# Patient Record
Sex: FEMALE | Race: OTHER | HISPANIC OR LATINO | Employment: UNEMPLOYED | URBAN - METROPOLITAN AREA
[De-identification: names, ages, dates, MRNs, and addresses within clinical notes are randomized per-mention and may not be internally consistent; named-entity substitution may affect disease eponyms.]

---

## 2023-07-10 ENCOUNTER — TELEPHONE (OUTPATIENT)
Dept: OBSTETRICS AND GYNECOLOGY | Facility: CLINIC | Age: 23
End: 2023-07-10
Payer: MEDICAID

## 2023-07-18 DIAGNOSIS — Z34.91 ENCOUNTER FOR PREGNANCY RELATED EXAMINATION IN FIRST TRIMESTER: Primary | ICD-10-CM

## 2023-07-19 ENCOUNTER — PROCEDURE VISIT (OUTPATIENT)
Dept: OBSTETRICS AND GYNECOLOGY | Facility: CLINIC | Age: 23
End: 2023-07-19
Payer: MEDICAID

## 2023-07-19 DIAGNOSIS — Z34.91 ENCOUNTER FOR PREGNANCY RELATED EXAMINATION IN FIRST TRIMESTER: ICD-10-CM

## 2023-07-19 PROCEDURE — 76817 TRANSVAGINAL US OBSTETRIC: CPT | Mod: S$GLB,,, | Performed by: OBSTETRICS & GYNECOLOGY

## 2023-07-19 PROCEDURE — 76817 US OB/GYN PROCEDURE (VIEWPOINT): ICD-10-PCS | Mod: S$GLB,,, | Performed by: OBSTETRICS & GYNECOLOGY

## 2023-08-01 ENCOUNTER — TELEPHONE (OUTPATIENT)
Dept: OBSTETRICS AND GYNECOLOGY | Facility: CLINIC | Age: 23
End: 2023-08-01
Payer: MEDICAID

## 2023-08-01 NOTE — TELEPHONE ENCOUNTER
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 Pt stated that she understands.      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 Can she safely accept the position in her condition? Please call back at [phone] as soon as possible, as she could potentially be skipped over if she waits too long to respond.

## 2023-08-22 LAB
ABS NRBC COUNT: 0 X 10 3/UL (ref 0–0.01)
ABSOLUTE BASOPHIL: 0.03 X 10 3/UL (ref 0–0.22)
ABSOLUTE EOSINOPHIL: 0.16 X 10 3/UL (ref 0.04–0.54)
ABSOLUTE IMMATURE GRAN: 0.05 X 10 3/UL (ref 0–0.04)
ABSOLUTE LYMPHOCYTE: 2.58 X 10 3/UL (ref 0.86–4.75)
ABSOLUTE MONOCYTE: 0.73 X 10 3/UL (ref 0.22–1.08)
AMPHETAMINES (500): NEGATIVE NG/ML
ANTIBODY SCREEN: NEGATIVE
BARBITURATES (200): NEGATIVE NG/ML
BASOPHILS NFR BLD: 0.3 % (ref 0.2–1.2)
BENZODIAZEPINES: NEGATIVE NG/ML
BLOOD GROUPING: NORMAL
BLOOD TYPE (D): POSITIVE
COCAINE (150): NEGATIVE NG/ML
EOSINOPHIL NFR BLD: 1.5 % (ref 0.7–7)
HBV SURFACE AG SERPL QL IA: NONREACTIVE
HCT VFR BLD AUTO: 39.3 % (ref 37–47)
HCV IGG SERPL QL IA: NONREACTIVE
HGB BLD-MCNC: 12.6 G/DL (ref 12–16)
HIV 1+2 AB+HIV1 P24 AG SERPL QL IA: NONREACTIVE
IMMATURE GRANULOCYTES: 0.5 % (ref 0–0.5)
LYMPHOCYTES NFR BLD: 23.8 % (ref 19.3–53.1)
MARIJUANA QUANTITATION: >300 NG/ML (ref 0–50)
MARIJUANA, THC (50): ABNORMAL NG/ML
MCH RBC QN AUTO: 26.3 PG (ref 27–32)
MCHC RBC AUTO-ENTMCNC: 32.1 G/DL (ref 32–36)
MCV RBC AUTO: 81.9 FL (ref 82–100)
METHADONE: NEGATIVE NG/ML
MONOCYTES NFR BLD: 6.7 % (ref 4.7–12.5)
NEUTROPHILS # BLD AUTO: 7.3 X 10 3/UL (ref 2.15–7.56)
NEUTROPHILS NFR BLD: 67.2 % (ref 34–71.1)
NUCLEATED RED BLOOD CELLS: 0 /100 WBC (ref 0–0.2)
OPIATES: NEGATIVE NG/ML
OXYCODONE: NEGATIVE NG/ML
PH: 7.4 (ref 4.5–8)
PHENCYCLIDINE (25): NEGATIVE NG/ML
PLATELET # BLD AUTO: 315 X 10 3/UL (ref 135–400)
RBC # BLD AUTO: 4.8 X 10 6/UL (ref 4.2–5.4)
RDW-SD: 43.7 FL (ref 37–54)
RUBELLA IGG SCREEN: NORMAL
SICKLE CELL PREP: NEGATIVE
SYPHILIS TREPONEMAL ANTIBODY: NONREACTIVE
URINE CREATININE D/S: 184.3 MG/DL
URINE CULTURE, ROUTINE: NORMAL
WBC # BLD: 10.85 X 10 3/UL (ref 4.3–10.8)

## 2023-08-29 ENCOUNTER — ROUTINE PRENATAL (OUTPATIENT)
Dept: OBSTETRICS AND GYNECOLOGY | Facility: CLINIC | Age: 23
End: 2023-08-29
Payer: MEDICAID

## 2023-08-29 VITALS — DIASTOLIC BLOOD PRESSURE: 82 MMHG | HEART RATE: 76 BPM | SYSTOLIC BLOOD PRESSURE: 135 MMHG | WEIGHT: 277 LBS

## 2023-08-29 DIAGNOSIS — Z03.89 ENCOUNTER FOR OBSERVATION FOR OTHER SUSPECTED DISEASES AND CONDITIONS RULED OUT: ICD-10-CM

## 2023-08-29 DIAGNOSIS — Z34.91 ENCOUNTER FOR PREGNANCY RELATED EXAMINATION IN FIRST TRIMESTER: Primary | ICD-10-CM

## 2023-08-29 DIAGNOSIS — F12.10 MILD TETRAHYDROCANNABINOL (THC) ABUSE: ICD-10-CM

## 2023-08-29 DIAGNOSIS — E66.9 OBESITY, UNSPECIFIED CLASSIFICATION, UNSPECIFIED OBESITY TYPE, UNSPECIFIED WHETHER SERIOUS COMORBIDITY PRESENT: ICD-10-CM

## 2023-08-29 DIAGNOSIS — O16.9 ELEVATED BLOOD PRESSURE AFFECTING PREGNANCY, ANTEPARTUM: ICD-10-CM

## 2023-08-29 LAB
AMPHETAMINES (500): NEGATIVE NG/ML
BARBITURATES (200): NEGATIVE NG/ML
BENZODIAZEPINES: NEGATIVE NG/ML
COCAINE (150): NEGATIVE NG/ML
MARIJUANA QUANTITATION: >300 NG/ML (ref 0–50)
MARIJUANA, THC (50): ABNORMAL NG/ML
METHADONE: NEGATIVE NG/ML
OPIATES: NEGATIVE NG/ML
OXYCODONE: NEGATIVE NG/ML
PH: 5.8 (ref 4.5–8)
PHENCYCLIDINE (25): NEGATIVE NG/ML
URINE CREATININE D/S: 348.6 MG/DL

## 2023-08-29 PROCEDURE — 99204 PR OFFICE/OUTPT VISIT, NEW, LEVL IV, 45-59 MIN: ICD-10-PCS | Mod: TH,S$GLB,, | Performed by: OBSTETRICS & GYNECOLOGY

## 2023-08-29 PROCEDURE — 99204 OFFICE O/P NEW MOD 45 MIN: CPT | Mod: TH,S$GLB,, | Performed by: OBSTETRICS & GYNECOLOGY

## 2023-08-29 NOTE — PROGRESS NOTES
Subjective:       Patient ID: Naina Brown is a 22 y.o.  at 12w4d   Chief Complaint:  Initial Prenatal Visit      History of Present Illness  here for new ob exam.  Labs and history were reviewed with the patient today  No complaints      History reviewed. No pertinent past medical history.    History reviewed. No pertinent surgical history.    OB:    OB History    Para Term  AB Living   1             SAB IAB Ectopic Multiple Live Births                  # Outcome Date GA Lbr Byron/2nd Weight Sex Delivery Anes PTL Lv   1 Current              Gyn: no STD, never had abn pap   Meds: No current outpatient medications on file.    All: Review of patient's allergies indicates:  No Known Allergies    SH:   Social History     Tobacco Use    Smoking status: Never    Smokeless tobacco: Never   Substance Use Topics    Alcohol use: Not Currently      FH: family history is not on file.      Review of Systems  nml 1st trimester sx- sob, dec excercixe tolerance, fatigue and nausea  Neg for vag bleed, dc, vomiting, cp, lof, fever, chills, ns, visual changes, swelling, headaches, constipation/diarrhea, dysuria, freq/urgency of urination     Objective:     Vitals:    23 1259   BP: 135/82   Pulse: 76   Weight: 125.6 kg (277 lb)       NAD  NCAT  pupils normal size  Skin nml no rashes or lesions  No resp distress, resp even and unlabored  nt nd, no rebound no guarding  nml ext fem gent, normal cvx uterus and adnexa, no dc or bleeding  nml appearing rectum  No cyanosis or clubbing, edema appropriate for pregn    Uterus size approp for gest age         Assessment:        1. Encounter for pregnancy related examination in first trimester    2. Encounter for observation for other suspected diseases and conditions ruled out    3. Mild tetrahydrocannabinol (THC) abuse    4. Elevated blood pressure affecting pregnancy, antepartum    5. Obesity, unspecified classification, unspecified obesity type, unspecified  whether serious comorbidity present               Plan:      Encounter for pregnancy related examination in first trimester  -     Liquid-based pap smear, screening  -     Drug Screen 8 Panel    Encounter for observation for other suspected diseases and conditions ruled out  -     Drug Screen 8 Panel    Mild tetrahydrocannabinol (THC) abuse    Elevated blood pressure affecting pregnancy, antepartum    Obesity, unspecified classification, unspecified obesity type, unspecified whether serious comorbidity present           Pain fever bleeding precautions  Encouraged PNV  rtc 4 wks    Baby asa  Limit weight gain  Talked about stopping THC use

## 2023-09-01 ENCOUNTER — NURSE TRIAGE (OUTPATIENT)
Dept: ADMINISTRATIVE | Facility: CLINIC | Age: 23
End: 2023-09-01
Payer: MEDICAID

## 2023-09-01 LAB
CHLAMYDIA: NEGATIVE
GONORRHEA: NEGATIVE
Lab: NORMAL
SOURCE: NORMAL
SOURCE: NORMAL
TRICHOMONAS AMPLIFIED: NEGATIVE

## 2023-09-01 NOTE — TELEPHONE ENCOUNTER
Pt reports lower back pain 7/10 w/ intermittent abdominal pain 3/10. Spotting yesterday. Advised, per protocol. Verbalizes understanding.    Reason for Disposition   MODERATE-SEVERE abdominal pain    Additional Information   Negative: Passed out (i.e., fainted, collapsed and was not responding)   Negative: Shock suspected (e.g., cold/pale/clammy skin, too weak to stand, low BP, rapid pulse)   Negative: Difficult to awaken or acting confused (e.g., disoriented, slurred speech)   Negative: Sounds like a life-threatening emergency to the triager    Protocols used: Pregnancy - Abdominal Pain Less Than 20 Weeks EGA-A-OH

## 2023-09-22 ENCOUNTER — PATIENT MESSAGE (OUTPATIENT)
Dept: OTHER | Facility: OTHER | Age: 23
End: 2023-09-22
Payer: MEDICAID

## 2023-09-25 ENCOUNTER — ROUTINE PRENATAL (OUTPATIENT)
Dept: OBSTETRICS AND GYNECOLOGY | Facility: CLINIC | Age: 23
End: 2023-09-25
Payer: MEDICAID

## 2023-09-25 VITALS — WEIGHT: 284 LBS | DIASTOLIC BLOOD PRESSURE: 84 MMHG | HEART RATE: 96 BPM | SYSTOLIC BLOOD PRESSURE: 131 MMHG

## 2023-09-25 DIAGNOSIS — Z34.02 ENCOUNTER FOR SUPERVISION OF NORMAL FIRST PREGNANCY IN SECOND TRIMESTER: Primary | ICD-10-CM

## 2023-09-25 PROCEDURE — 99213 OFFICE O/P EST LOW 20 MIN: CPT | Mod: TH,S$GLB,, | Performed by: OBSTETRICS & GYNECOLOGY

## 2023-09-25 PROCEDURE — 99213 PR OFFICE/OUTPT VISIT, EST, LEVL III, 20-29 MIN: ICD-10-PCS | Mod: TH,S$GLB,, | Performed by: OBSTETRICS & GYNECOLOGY

## 2023-09-25 NOTE — PROGRESS NOTES
Subjective:       Patient ID: Naina Brown is a 23 y.o.  at 16w3d      Chief Complaint:  Routine Prenatal Visit      History of Present Illness  No complaints. Reports normal sx. Labs and history reviewed with pt.       Review of Systems  Denies n/v, f/c, dysuria, contractions,   VD, VB, round ligament pain, headaches       Objective:     Vitals:    23 1309   BP: 131/84   Pulse: 96     Wt Readings from Last 3 Encounters:   23 128.8 kg (284 lb)   23 125.6 kg (277 lb)        nad  NCAT  pupils normal size  Skin nml no rashes or lesions  No resp distress, resp even and unlabored  Gravid nt, no rebound no guarding  No cyanosis or clubbing, edema appropriate for pregn    FHT: 150's       Assessment:        1. Encounter for supervision of normal first pregnancy in second trimester                Plan:      Encouraged PNV  Pain, fever, bleeding precautions   RTC 4 weeks

## 2023-09-29 ENCOUNTER — PATIENT MESSAGE (OUTPATIENT)
Dept: OTHER | Facility: OTHER | Age: 23
End: 2023-09-29
Payer: MEDICAID

## 2023-10-05 LAB
AFP MOM: 0.57
AFP, SERUM: 23 NG/ML
CALC'D GESTATIONAL AGE: 20.1 WEEKS
COLLECTION DATE: NORMAL
COMMENT: NORMAL
DATE OF BIRTH: NORMAL
DONOR AGE: EGG RETRIEVAL: NORMAL
DONOR EGG: NO
EDD DETERMINED BY: NORMAL
EST'D DATE OF DELIVERY: NORMAL
HX OF NEURAL TUBE DEFECTS: NO
INSULIN DEPEND DIABETIC: NO
INTERPRETATION: NORMAL
Lab: NORMAL
MATERNAL WEIGHT: 282 LBS
MOTHER'S ETHNIC ORIGIN: NORMAL
NUMBER OF FETUSES: 1
PREV PREGNANCY DOWN SYND: NO
REPEAT SPECIMEN: NO
RISK FOR ONTD: NORMAL

## 2023-10-20 ENCOUNTER — PATIENT MESSAGE (OUTPATIENT)
Dept: OTHER | Facility: OTHER | Age: 23
End: 2023-10-20
Payer: MEDICAID

## 2023-10-23 ENCOUNTER — ROUTINE PRENATAL (OUTPATIENT)
Dept: OBSTETRICS AND GYNECOLOGY | Facility: CLINIC | Age: 23
End: 2023-10-23
Payer: MEDICAID

## 2023-10-23 VITALS
HEIGHT: 63 IN | HEART RATE: 89 BPM | SYSTOLIC BLOOD PRESSURE: 121 MMHG | BODY MASS INDEX: 51.05 KG/M2 | WEIGHT: 288.13 LBS | DIASTOLIC BLOOD PRESSURE: 78 MMHG

## 2023-10-23 DIAGNOSIS — Z36.89 ENCOUNTER FOR FETAL ANATOMIC SURVEY: ICD-10-CM

## 2023-10-23 DIAGNOSIS — Z34.82 ENCOUNTER FOR SUPERVISION OF NORMAL PREGNANCY IN MULTIGRAVIDA IN SECOND TRIMESTER: Primary | ICD-10-CM

## 2023-10-23 PROCEDURE — 99213 OFFICE O/P EST LOW 20 MIN: CPT | Mod: TH,S$GLB,, | Performed by: NURSE PRACTITIONER

## 2023-10-23 PROCEDURE — 99213 PR OFFICE/OUTPT VISIT, EST, LEVL III, 20-29 MIN: ICD-10-PCS | Mod: TH,S$GLB,, | Performed by: NURSE PRACTITIONER

## 2023-10-23 RX ORDER — ASPIRIN 81 MG/1
81 TABLET ORAL DAILY
COMMUNITY

## 2023-10-23 NOTE — PROGRESS NOTES
Subjective:       Patient ID: Naina Brown is a 23 y.o.  at 20w3d      Chief Complaint:  Routine Prenatal Visit      History of Present Illness  No complaints. Reports normal fetal movement. Labs and history reviewed with pt.       Review of Systems  Denies n/v, f/c, dysuria, contractions,   VD, VB, round ligament pain, headaches      OB History          1    Para        Term                AB        Living             SAB        IAB        Ectopic        Multiple        Live Births                       Objective:     Vitals:    10/23/23 1033   BP: 121/78   Pulse: 89     Wt Readings from Last 3 Encounters:   10/23/23 130.7 kg (288 lb 2 oz)   23 128.8 kg (284 lb)   23 125.6 kg (277 lb)        nad  NCAT  pupils normal size  Skin nml no rashes or lesions  No resp distress, resp even and unlabored  Gravid nt, no rebound no guarding  No cyanosis or clubbing, edema appropriate for pregn  FH AGA  FHT: 140s       Assessment:        1. Encounter for supervision of normal pregnancy in multigravida in second trimester    2. Encounter for fetal anatomic survey                Plan:        Encounter for supervision of normal pregnancy in multigravida in second trimester    Encounter for fetal anatomic survey  -     US OB/GYN Procedure ([facility]) - Extended List; Future         Discussed flu vaccine  Encouraged PNV  Pain, fever, bleeding precautions   Follow up in about 4 weeks (around 2023).

## 2023-11-06 ENCOUNTER — PROCEDURE VISIT (OUTPATIENT)
Dept: OBSTETRICS AND GYNECOLOGY | Facility: CLINIC | Age: 23
End: 2023-11-06
Payer: MEDICAID

## 2023-11-06 DIAGNOSIS — Z36.89 ENCOUNTER FOR FETAL ANATOMIC SURVEY: ICD-10-CM

## 2023-11-06 PROCEDURE — 76805 US OB/GYN EXTENDED PROCEDURE (VIEWPOINT): ICD-10-PCS | Mod: S$GLB,,, | Performed by: OBSTETRICS & GYNECOLOGY

## 2023-11-06 PROCEDURE — 76805 OB US >/= 14 WKS SNGL FETUS: CPT | Mod: S$GLB,,, | Performed by: OBSTETRICS & GYNECOLOGY

## 2023-11-07 DIAGNOSIS — Z36.2 ENCOUNTER FOR FOLLOW-UP ULTRASOUND OF FETAL ANATOMY: Primary | ICD-10-CM

## 2023-11-20 ENCOUNTER — PROCEDURE VISIT (OUTPATIENT)
Dept: OBSTETRICS AND GYNECOLOGY | Facility: CLINIC | Age: 23
End: 2023-11-20
Payer: MEDICAID

## 2023-11-20 ENCOUNTER — ROUTINE PRENATAL (OUTPATIENT)
Dept: OBSTETRICS AND GYNECOLOGY | Facility: CLINIC | Age: 23
End: 2023-11-20
Payer: MEDICAID

## 2023-11-20 VITALS
WEIGHT: 293 LBS | BODY MASS INDEX: 52.83 KG/M2 | DIASTOLIC BLOOD PRESSURE: 77 MMHG | SYSTOLIC BLOOD PRESSURE: 113 MMHG | HEART RATE: 94 BPM

## 2023-11-20 DIAGNOSIS — Z34.02 ENCOUNTER FOR SUPERVISION OF NORMAL FIRST PREGNANCY IN SECOND TRIMESTER: Primary | ICD-10-CM

## 2023-11-20 DIAGNOSIS — Z36.2 ENCOUNTER FOR FOLLOW-UP ULTRASOUND OF FETAL ANATOMY: ICD-10-CM

## 2023-11-20 PROCEDURE — 99213 OFFICE O/P EST LOW 20 MIN: CPT | Mod: 25,TH,S$GLB, | Performed by: NURSE PRACTITIONER

## 2023-11-20 PROCEDURE — 76816 OB US FOLLOW-UP PER FETUS: CPT | Mod: S$GLB,,, | Performed by: OBSTETRICS & GYNECOLOGY

## 2023-11-20 PROCEDURE — 90686 IIV4 VACC NO PRSV 0.5 ML IM: CPT | Mod: S$GLB,,, | Performed by: NURSE PRACTITIONER

## 2023-11-20 PROCEDURE — 90471 FLU VACCINE (QUAD) GREATER THAN OR EQUAL TO 3YO PRESERVATIVE FREE IM: ICD-10-PCS | Mod: S$GLB,,, | Performed by: NURSE PRACTITIONER

## 2023-11-20 PROCEDURE — 99213 PR OFFICE/OUTPT VISIT, EST, LEVL III, 20-29 MIN: ICD-10-PCS | Mod: 25,TH,S$GLB, | Performed by: NURSE PRACTITIONER

## 2023-11-20 PROCEDURE — 90686 FLU VACCINE (QUAD) GREATER THAN OR EQUAL TO 3YO PRESERVATIVE FREE IM: ICD-10-PCS | Mod: S$GLB,,, | Performed by: NURSE PRACTITIONER

## 2023-11-20 PROCEDURE — 76816 US OB/GYN EXTENDED PROCEDURE (VIEWPOINT): ICD-10-PCS | Mod: S$GLB,,, | Performed by: OBSTETRICS & GYNECOLOGY

## 2023-11-20 PROCEDURE — 90471 IMMUNIZATION ADMIN: CPT | Mod: S$GLB,,, | Performed by: NURSE PRACTITIONER

## 2023-11-20 NOTE — PROGRESS NOTES
Subjective:       Patient ID: Naina Brown is a 23 y.o.  at 24w3d      Chief Complaint:  Routine Prenatal Visit      History of Present Illness  No complaints. Reports normal fetal movement. Labs and history reviewed with pt.       Review of Systems  Denies n/v, f/c, dysuria, contractions,   VD, VB, round ligament pain, headaches      OB History          1    Para        Term                AB        Living             SAB        IAB        Ectopic        Multiple        Live Births                       Objective:     Vitals:    23 1437   BP: 113/77   Pulse: 94     Wt Readings from Last 3 Encounters:   23 135.3 kg (298 lb 4 oz)   10/23/23 130.7 kg (288 lb 2 oz)   23 128.8 kg (284 lb)        nad  NCAT  pupils normal size  Skin nml no rashes or lesions  No resp distress, resp even and unlabored  Gravid nt, no rebound no guarding  No cyanosis or clubbing, edema appropriate for pregn  FH AGA  FHT: 150s       Assessment:        1. Encounter for supervision of normal first pregnancy in second trimester                Plan:        Encounter for supervision of normal first pregnancy in second trimester  -     Glucose, 1 Hr Post 50 GM; Future; Expected date: 2023  -     Influenza - Quadrivalent (PF)         KA for repeat u/s today   Encouraged PNV  Pain, fever, bleeding precautions   Follow up in about 4 weeks (around 2023).

## 2023-12-01 ENCOUNTER — PATIENT MESSAGE (OUTPATIENT)
Dept: OTHER | Facility: OTHER | Age: 23
End: 2023-12-01
Payer: MEDICAID

## 2023-12-15 ENCOUNTER — PATIENT MESSAGE (OUTPATIENT)
Dept: OTHER | Facility: OTHER | Age: 23
End: 2023-12-15
Payer: MEDICAID

## 2023-12-18 ENCOUNTER — ROUTINE PRENATAL (OUTPATIENT)
Dept: OBSTETRICS AND GYNECOLOGY | Facility: CLINIC | Age: 23
End: 2023-12-18
Payer: MEDICAID

## 2023-12-18 VITALS
HEART RATE: 98 BPM | DIASTOLIC BLOOD PRESSURE: 77 MMHG | SYSTOLIC BLOOD PRESSURE: 119 MMHG | BODY MASS INDEX: 52.97 KG/M2 | WEIGHT: 293 LBS

## 2023-12-18 DIAGNOSIS — Z34.03 ENCOUNTER FOR SUPERVISION OF NORMAL FIRST PREGNANCY IN THIRD TRIMESTER: Primary | ICD-10-CM

## 2023-12-18 LAB
ABS NRBC COUNT: 0 X 10 3/UL (ref 0–0.01)
ABSOLUTE BASOPHIL: 0.03 X 10 3/UL (ref 0–0.22)
ABSOLUTE EOSINOPHIL: 0.07 X 10 3/UL (ref 0.04–0.54)
ABSOLUTE IMMATURE GRAN: 0.13 X 10 3/UL (ref 0–0.04)
ABSOLUTE LYMPHOCYTE: 1.75 X 10 3/UL (ref 0.86–4.75)
ABSOLUTE MONOCYTE: 0.52 X 10 3/UL (ref 0.22–1.08)
AMPHETAMINES (500): NEGATIVE NG/ML
BARBITURATES (200): NEGATIVE NG/ML
BASOPHILS NFR BLD: 0.2 % (ref 0.2–1.2)
BENZODIAZEPINES: NEGATIVE NG/ML
COCAINE (150): NEGATIVE NG/ML
EOSINOPHIL NFR BLD: 0.6 % (ref 0.7–7)
GLUCOSE 1 HR POST 50 GM: 112 MG/DL
HCT VFR BLD AUTO: 33.9 % (ref 37–47)
HGB BLD-MCNC: 11 G/DL (ref 12–16)
IMMATURE GRANULOCYTES: 1 % (ref 0–0.5)
LYMPHOCYTES NFR BLD: 13.8 % (ref 19.3–53.1)
MARIJUANA, THC (50): NEGATIVE NG/ML
MCH RBC QN AUTO: 25.1 PG (ref 27–32)
MCHC RBC AUTO-ENTMCNC: 32.4 G/DL (ref 32–36)
MCV RBC AUTO: 77.4 FL (ref 82–100)
METHADONE: NEGATIVE NG/ML
MONOCYTES NFR BLD: 4.1 % (ref 4.7–12.5)
NEUTROPHILS # BLD AUTO: 10.14 X 10 3/UL (ref 2.15–7.56)
NEUTROPHILS NFR BLD: 80.3 % (ref 34–71.1)
NUCLEATED RED BLOOD CELLS: 0 /100 WBC (ref 0–0.2)
OPIATES: NEGATIVE NG/ML
OXYCODONE: NEGATIVE NG/ML
PH: 5.8 (ref 4.5–8)
PHENCYCLIDINE (25): NEGATIVE NG/ML
PLATELET # BLD AUTO: 311 X 10 3/UL (ref 135–400)
RBC # BLD AUTO: 4.38 X 10 6/UL (ref 4.2–5.4)
RDW-SD: 39.5 FL (ref 37–54)
URINE CREATININE D/S: 205.2 MG/DL
WBC # BLD: 12.64 X 10 3/UL (ref 4.3–10.8)

## 2023-12-18 PROCEDURE — 90715 TDAP VACCINE GREATER THAN OR EQUAL TO 7YO IM: ICD-10-PCS | Mod: S$GLB,,, | Performed by: OBSTETRICS & GYNECOLOGY

## 2023-12-18 PROCEDURE — 90471 TDAP VACCINE GREATER THAN OR EQUAL TO 7YO IM: ICD-10-PCS | Mod: S$GLB,,, | Performed by: OBSTETRICS & GYNECOLOGY

## 2023-12-18 PROCEDURE — 99213 OFFICE O/P EST LOW 20 MIN: CPT | Mod: 25,TH,S$GLB, | Performed by: OBSTETRICS & GYNECOLOGY

## 2023-12-18 PROCEDURE — 99213 PR OFFICE/OUTPT VISIT, EST, LEVL III, 20-29 MIN: ICD-10-PCS | Mod: 25,TH,S$GLB, | Performed by: OBSTETRICS & GYNECOLOGY

## 2023-12-18 PROCEDURE — 90715 TDAP VACCINE 7 YRS/> IM: CPT | Mod: S$GLB,,, | Performed by: OBSTETRICS & GYNECOLOGY

## 2023-12-18 PROCEDURE — 90471 IMMUNIZATION ADMIN: CPT | Mod: S$GLB,,, | Performed by: OBSTETRICS & GYNECOLOGY

## 2023-12-29 ENCOUNTER — PATIENT MESSAGE (OUTPATIENT)
Dept: OTHER | Facility: OTHER | Age: 23
End: 2023-12-29
Payer: MEDICAID

## 2024-01-12 ENCOUNTER — PATIENT MESSAGE (OUTPATIENT)
Dept: OTHER | Facility: OTHER | Age: 24
End: 2024-01-12
Payer: MEDICAID

## 2024-01-22 ENCOUNTER — ROUTINE PRENATAL (OUTPATIENT)
Dept: OBSTETRICS AND GYNECOLOGY | Facility: CLINIC | Age: 24
End: 2024-01-22
Payer: MEDICAID

## 2024-01-22 VITALS — WEIGHT: 293 LBS | BODY MASS INDEX: 58 KG/M2 | DIASTOLIC BLOOD PRESSURE: 86 MMHG | SYSTOLIC BLOOD PRESSURE: 137 MMHG

## 2024-01-22 DIAGNOSIS — Z34.03 ENCOUNTER FOR SUPERVISION OF NORMAL FIRST PREGNANCY IN THIRD TRIMESTER: Primary | ICD-10-CM

## 2024-01-22 DIAGNOSIS — O12.13 PROTEINURIA AFFECTING PREGNANCY IN THIRD TRIMESTER: ICD-10-CM

## 2024-01-22 DIAGNOSIS — E66.9 OBESITY, UNSPECIFIED CLASSIFICATION, UNSPECIFIED OBESITY TYPE, UNSPECIFIED WHETHER SERIOUS COMORBIDITY PRESENT: ICD-10-CM

## 2024-01-22 PROCEDURE — 99213 OFFICE O/P EST LOW 20 MIN: CPT | Mod: TH,S$GLB,, | Performed by: NURSE PRACTITIONER

## 2024-01-22 NOTE — PROGRESS NOTES
CC: Follow-Up OB    HPI:   23 y.o.  at 33w3d with EDC of 3/8/2024, by Ultrasound, here for her follow up OB visit.  Denies any complaints.    Pregnancy ROS:  Positive fetal movement   Negative leakage of fluid   Negative vaginal bleeding   Negative headache, vision changes, RUQ pain, epigastric pain  Negative contractions/abdominal pain   Negative pelvic pressure     Physical Exam:  Prenatal Vitals  BP: 137/86  Weight: (!) 148.5 kg (327 lb 6.4 oz)  Fetal Heart Rate: 140s  Urine Glucose: Negative  Urine Albumin: 1+    Wt Readings from Last 3 Encounters:   24 (!) 148.5 kg (327 lb 6.4 oz)   23 135.6 kg (299 lb)   23 135.3 kg (298 lb 4 oz)       Body mass index is 58 kg/m².    General: NAD, well developed, well nourished  Psych: alert and oriented to person, time and place, normal affect  HEENT: normocephalic, atraumatic  Abd: Gravid, soft, NT, ND  Skin: warm, dry  Neuro: normal gait, gross motor function intact  No cyanosis, clubbing or edema    FHTs: 140s      Maternal Blood Type: O+/-    ASSESSMENT: 23 y.o.  @ 33w3d with   1. Encounter for supervision of normal first pregnancy in third trimester    2. Obesity, unspecified classification, unspecified obesity type, unspecified whether serious comorbidity present    3. Proteinuria affecting pregnancy in third trimester         PLAN:  Encounter for supervision of normal first pregnancy in third trimester    Obesity, unspecified classification, unspecified obesity type, unspecified whether serious comorbidity present  -     US OB/GYN Procedure ([facility]); Future    Proteinuria affecting pregnancy in third trimester       BPP/growth u/s   Discussed POC with Dakotah Zimmerman   Pain, fever, bleeding precautions  Labor, Fetal movement, and Preeclampsia precautions  Cont PNV  Return to clinic in 1 weeks

## 2024-01-25 ENCOUNTER — PROCEDURE VISIT (OUTPATIENT)
Dept: OBSTETRICS AND GYNECOLOGY | Facility: CLINIC | Age: 24
End: 2024-01-25
Payer: MEDICAID

## 2024-01-25 DIAGNOSIS — E66.9 OBESITY, UNSPECIFIED CLASSIFICATION, UNSPECIFIED OBESITY TYPE, UNSPECIFIED WHETHER SERIOUS COMORBIDITY PRESENT: ICD-10-CM

## 2024-01-25 PROCEDURE — 76816 OB US FOLLOW-UP PER FETUS: CPT | Mod: S$GLB,,, | Performed by: OBSTETRICS & GYNECOLOGY

## 2024-01-25 PROCEDURE — 76819 FETAL BIOPHYS PROFIL W/O NST: CPT | Mod: S$GLB,,, | Performed by: OBSTETRICS & GYNECOLOGY

## 2024-01-29 ENCOUNTER — ROUTINE PRENATAL (OUTPATIENT)
Dept: OBSTETRICS AND GYNECOLOGY | Facility: CLINIC | Age: 24
End: 2024-01-29
Payer: MEDICAID

## 2024-01-29 VITALS
DIASTOLIC BLOOD PRESSURE: 87 MMHG | BODY MASS INDEX: 57.81 KG/M2 | SYSTOLIC BLOOD PRESSURE: 139 MMHG | WEIGHT: 293 LBS | HEART RATE: 80 BPM

## 2024-01-29 DIAGNOSIS — Z34.03 ENCOUNTER FOR SUPERVISION OF NORMAL FIRST PREGNANCY IN THIRD TRIMESTER: Primary | ICD-10-CM

## 2024-01-29 DIAGNOSIS — O16.9 ELEVATED BLOOD PRESSURE AFFECTING PREGNANCY, ANTEPARTUM: ICD-10-CM

## 2024-01-29 DIAGNOSIS — O12.13 PROTEINURIA AFFECTING PREGNANCY IN THIRD TRIMESTER: ICD-10-CM

## 2024-01-29 LAB
AMORPH URATE CRY URNS QL MICRO: ABNORMAL
BACTERIA #/AREA URNS HPF: ABNORMAL /[HPF]
BILIRUB UR QL STRIP: NEGATIVE
CLARITY UR: ABNORMAL
COLOR UR: YELLOW
EPITHELIAL CELLS: ABNORMAL
GLUCOSE (UA): NEGATIVE MG/DL
KETONES UR QL STRIP: NEGATIVE MG/DL
LEUKOCYTE ESTERASE UR QL STRIP: ABNORMAL
MUCOUS THREADS URNS QL MICRO: NEGATIVE
NITRITE UR QL STRIP: NEGATIVE
OCCULT BLOOD: ABNORMAL
PH, URINE: 6 (ref 5–7.5)
PROT UR QL STRIP: 75 MG/DL
RBC/HPF: ABNORMAL
SP GR UR STRIP: 1.01 (ref 1–1.03)
UROBILINOGEN, URINE: 1 E.U./DL (ref 0–1)
WBC/HPF: ABNORMAL

## 2024-01-29 PROCEDURE — 99213 OFFICE O/P EST LOW 20 MIN: CPT | Mod: TH,S$GLB,, | Performed by: NURSE PRACTITIONER

## 2024-01-29 NOTE — PROGRESS NOTES
CC: Follow-Up OB    HPI:   23 y.o.  at 34w3d with EDC of 3/8/2024, by Ultrasound, here for her follow up OB visit.  Denies any complaints.    Pregnancy ROS:  Positive fetal movement   Negative leakage of fluid   Negative vaginal bleeding   Negative headache, vision changes, RUQ pain, epigastric pain  Negative contractions/abdominal pain   Negative pelvic pressure     Physical Exam:  Prenatal Vitals  BP: 139/87  Weight: (!) 148 kg (326 lb 6 oz)  Fetal Heart Rate: 140's  Urine Glucose: Negative  Urine Albumin: 1+    Wt Readings from Last 3 Encounters:   24 (!) 148 kg (326 lb 6 oz)   24 (!) 148.5 kg (327 lb 6.4 oz)   23 135.6 kg (299 lb)       Body mass index is 57.81 kg/m².    General: NAD, well developed, well nourished  Psych: alert and oriented to person, time and place, normal affect  HEENT: normocephalic, atraumatic  Abd: Gravid, soft, NT, ND  Skin: warm, dry  Neuro: normal gait, gross motor function intact  No cyanosis, clubbing or edema    FHTs: 140s  FH: FH>dates    Maternal Blood Type: O+/-    Ultrasound 24 EFW 65%     ALFREDO 18.5     Cephalic  BPP            ASSESSMENT: 23 y.o.  @ 34w3d with   1. Encounter for supervision of normal first pregnancy in third trimester    2. Elevated blood pressure affecting pregnancy, antepartum    3. Proteinuria affecting pregnancy in third trimester         PLAN:  Encounter for supervision of normal first pregnancy in third trimester    Elevated blood pressure affecting pregnancy, antepartum  -     US OB/GYN Procedure ([facility]); Future    Proteinuria affecting pregnancy in third trimester  -     Urinalysis, Reflex to Urine Culture Urine, Clean Catch       Pain, fever, bleeding precautions  Labor, Fetal movement, and Preeclampsia precautions  Cont PNV/ASA  Weekly BPPS   Return to clinic in 1 week

## 2024-02-02 ENCOUNTER — PATIENT MESSAGE (OUTPATIENT)
Dept: OTHER | Facility: OTHER | Age: 24
End: 2024-02-02
Payer: MEDICAID

## 2024-02-07 ENCOUNTER — PROCEDURE VISIT (OUTPATIENT)
Dept: OBSTETRICS AND GYNECOLOGY | Facility: CLINIC | Age: 24
End: 2024-02-07
Payer: MEDICAID

## 2024-02-07 ENCOUNTER — ROUTINE PRENATAL (OUTPATIENT)
Dept: OBSTETRICS AND GYNECOLOGY | Facility: CLINIC | Age: 24
End: 2024-02-07
Payer: MEDICAID

## 2024-02-07 VITALS
DIASTOLIC BLOOD PRESSURE: 96 MMHG | BODY MASS INDEX: 58.7 KG/M2 | HEART RATE: 94 BPM | SYSTOLIC BLOOD PRESSURE: 143 MMHG | WEIGHT: 293 LBS

## 2024-02-07 DIAGNOSIS — Z34.03 ENCOUNTER FOR SUPERVISION OF NORMAL FIRST PREGNANCY IN THIRD TRIMESTER: Primary | ICD-10-CM

## 2024-02-07 DIAGNOSIS — O16.9 ELEVATED BLOOD PRESSURE AFFECTING PREGNANCY, ANTEPARTUM: ICD-10-CM

## 2024-02-07 DIAGNOSIS — O12.13 PROTEINURIA AFFECTING PREGNANCY IN THIRD TRIMESTER: ICD-10-CM

## 2024-02-07 DIAGNOSIS — O16.3 HYPERTENSION AFFECTING PREGNANCY IN THIRD TRIMESTER: ICD-10-CM

## 2024-02-07 DIAGNOSIS — E66.01 MORBID OBESITY WITH BMI OF 50.0-59.9, ADULT: ICD-10-CM

## 2024-02-07 LAB
ALBUMIN SERPL BCP-MCNC: 2 G/DL (ref 3.4–5)
ALBUMIN/GLOBULIN RATIO: 0.42 RATIO (ref 1.1–1.8)
ALP SERPL-CCNC: 211 U/L (ref 46–116)
ALT SERPL W P-5'-P-CCNC: 17 U/L (ref 12–78)
ANION GAP SERPL CALC-SCNC: 9 MMOL/L (ref 3–11)
APTT PPP: 27.9 SEC (ref 25.2–38.7)
AST SERPL-CCNC: 21 U/L (ref 15–37)
BASOPHILS NFR BLD: 0.3 % (ref 0–3)
BILIRUB SERPL-MCNC: 0.3 MG/DL (ref 0–1)
BUN SERPL-MCNC: 9 MG/DL (ref 7–18)
BUN/CREAT SERPL: 12.85 RATIO (ref 7–18)
CALCIUM SERPL-MCNC: 8.1 MG/DL (ref 8.8–10.5)
CHLORIDE SERPL-SCNC: 104 MMOL/L (ref 100–108)
CO2 SERPL-SCNC: 22 MMOL/L (ref 21–32)
CREAT SERPL-MCNC: 0.7 MG/DL (ref 0.55–1.02)
CREATININE, RANDOM URINE: 465 MG/DL (ref 10–175)
EOSINOPHIL NFR BLD: 1.2 % (ref 1–3)
ERYTHROCYTE [DISTWIDTH] IN BLOOD BY AUTOMATED COUNT: 16 % (ref 12.5–18)
GFR ESTIMATION: > 60
GLOBULIN: 4.7 G/DL (ref 2.3–3.5)
GLUCOSE SERPL-MCNC: 78 MG/DL (ref 70–110)
HCT VFR BLD AUTO: 33.9 % (ref 37–47)
HGB BLD-MCNC: 10.7 G/DL (ref 12–16)
HYPOCHROMIA BLD QL SMEAR: NORMAL
INR PPP: 0.8 INR (ref 0.9–1.1)
LDH SERPL L TO P-CCNC: 285 U/L (ref 82–234)
LYMPHOCYTES NFR BLD: 29.4 % (ref 25–40)
MCH RBC QN AUTO: 23.6 PG (ref 27–31.2)
MCHC RBC AUTO-ENTMCNC: 31.6 G/DL (ref 31.8–35.4)
MCV RBC AUTO: 74.7 FL (ref 80–97)
MICROCYTES BLD QL SMEAR: NORMAL
MONOCYTES NFR BLD: 7.9 % (ref 1–15)
NEUTROPHILS # BLD AUTO: 4.46 10*3/UL (ref 1.8–7.7)
NEUTROPHILS NFR BLD: 60 % (ref 37–80)
NUCLEATED RED BLOOD CELLS: 0 %
PLATELETS: 240 10*3/UL (ref 142–424)
POTASSIUM SERPL-SCNC: 4.2 MMOL/L (ref 3.6–5.2)
PROT SERPL-MCNC: 6.7 G/DL (ref 6.4–8.2)
PROT UR QL STRIP: 275 MG/DL (ref 0–15)
PROTEIN/CREATININE RATIO: 0.59 MG/MG (ref 0.02–0.16)
PROTHROMBIN TIME: 9.4 SEC (ref 10.4–13.2)
RBC # BLD AUTO: 4.54 10*6/UL (ref 4.2–5.4)
SODIUM BLD-SCNC: 135 MMOL/L (ref 135–145)
URATE SERPL-MCNC: 4.5 MG/DL (ref 2.6–6)
WBC # BLD: 7.4 10*3/UL (ref 4.6–10.2)

## 2024-02-07 PROCEDURE — 76819 FETAL BIOPHYS PROFIL W/O NST: CPT | Mod: S$GLB,,, | Performed by: OBSTETRICS & GYNECOLOGY

## 2024-02-07 PROCEDURE — 99214 OFFICE O/P EST MOD 30 MIN: CPT | Mod: TH,S$GLB,, | Performed by: NURSE PRACTITIONER

## 2024-02-07 NOTE — PROGRESS NOTES
CC: Follow-Up OB    HPI:   23 y.o.  at 35w5d with EDC of 3/8/2024, by Ultrasound, here for her follow up OB visit.  Denies any complaints.    Pregnancy ROS:  Positive fetal movement   Negative leakage of fluid   Negative vaginal bleeding   Negative headache, vision changes, RUQ pain, epigastric pain  Negative contractions/abdominal pain   Negative pelvic pressure     Physical Exam:  Prenatal Vitals  BP: (!) 143/96  Weight: (!) 150.3 kg (331 lb 6.4 oz)  Fetal Heart Rate: 143 u/s  Urine Glucose: Negative  Urine Albumin: 3+    Wt Readings from Last 3 Encounters:   24 (!) 150.3 kg (331 lb 6.4 oz)   24 (!) 148 kg (326 lb 6 oz)   24 (!) 148.5 kg (327 lb 6.4 oz)       Body mass index is 58.7 kg/m².    General: NAD, well developed, well nourished  Psych: alert and oriented to person, time and place, normal affect  HEENT: normocephalic, atraumatic  Abd: Gravid, soft, NT, ND  Skin: warm, dry  Neuro: normal gait, gross motor function intact  Cvx closed/thick/posterior   No cyanosis, clubbing or edema    FHTs: 143 by u/s  FH: AGA    Maternal Blood Type: O+/-    ASSESSMENT: 23 y.o.  @ 35w5d with   1. Elevated blood pressure affecting pregnancy, antepartum    2. Proteinuria affecting pregnancy in third trimester    3. Encounter for supervision of normal first pregnancy in third trimester         PLAN:  Elevated blood pressure affecting pregnancy, antepartum    Proteinuria affecting pregnancy in third trimester    Encounter for supervision of normal first pregnancy in third trimester       To L& D for Pre E w/u  Dr Zimmerman and L&D notified   Pain, fever, bleeding precautions  Labor, Fetal movement, and Preeclampsia precautions  Cont PNV  Return to clinic in 1 week w/Dr Zimmerman

## 2024-02-08 ENCOUNTER — OUTSIDE PLACE OF SERVICE (OUTPATIENT)
Dept: OBSTETRICS AND GYNECOLOGY | Facility: CLINIC | Age: 24
End: 2024-02-08
Payer: MEDICAID

## 2024-02-08 LAB
GROUP B STREP MOLECULAR: NEGATIVE
PENICILLIN ALLERGIC: NO

## 2024-02-08 PROCEDURE — 59514 CESAREAN DELIVERY ONLY: CPT | Mod: AT,,, | Performed by: OBSTETRICS & GYNECOLOGY

## 2024-02-09 PROCEDURE — 99232 SBSQ HOSP IP/OBS MODERATE 35: CPT | Mod: ,,, | Performed by: OBSTETRICS & GYNECOLOGY

## 2024-02-10 PROCEDURE — 99238 HOSP IP/OBS DSCHRG MGMT 30/<: CPT | Mod: ,,, | Performed by: OBSTETRICS & GYNECOLOGY

## 2024-02-14 ENCOUNTER — POSTPARTUM VISIT (OUTPATIENT)
Dept: OBSTETRICS AND GYNECOLOGY | Facility: CLINIC | Age: 24
End: 2024-02-14
Payer: MEDICAID

## 2024-02-14 VITALS
BODY MASS INDEX: 53.82 KG/M2 | DIASTOLIC BLOOD PRESSURE: 90 MMHG | WEIGHT: 293 LBS | SYSTOLIC BLOOD PRESSURE: 138 MMHG | HEART RATE: 97 BPM

## 2024-02-14 DIAGNOSIS — Z09 FOLLOW-UP EXAMINATION, FOLLOWING OTHER SURGERY: Primary | ICD-10-CM

## 2024-02-14 DIAGNOSIS — I10 HYPERTENSION, UNSPECIFIED TYPE: ICD-10-CM

## 2024-02-14 PROCEDURE — 99024 POSTOP FOLLOW-UP VISIT: CPT | Mod: S$GLB,,, | Performed by: NURSE PRACTITIONER

## 2024-02-14 NOTE — PROGRESS NOTES
Subjective:       Patient ID: Naina Brown is a 23 y.o. female.    Chief Complaint:  Post-op Evaluation        History of Present Illness  Presents for postop exam sp PCS @ 35 weeks r/t PreE  Denies any complaints . Denies any issues with urination, bowel movements, heavy vaginal bleeding or depression.   +breast/bottle feeding       Review of Systems  Review of Systems   Constitutional:  Negative for activity change, appetite change, chills, diaphoresis and fever.   Eyes:  Negative for visual disturbance.   Respiratory:  Negative for shortness of breath and wheezing.    Cardiovascular:  Negative for chest pain.   Gastrointestinal:  Negative for blood in stool, constipation, diarrhea, nausea and vomiting.   Genitourinary:  Negative for dysuria, hematuria and menorrhagia.   Integumentary:  Negative for breast skin changes and breast tenderness.   Neurological:  Negative for headaches.   Psychiatric/Behavioral:  Negative for depression. The patient is not nervous/anxious.    Breast: Negative for lump, skin changes and tenderness         Objective:   Physical Exam:   Constitutional: She appears well-developed and well-nourished. No distress.    HENT:   Head: Normocephalic and atraumatic.      Cardiovascular:       Exam reveals no clubbing and no cyanosis.        Pulmonary/Chest: Effort normal. No respiratory distress.        Abdominal: Soft. She exhibits abdominal incision. She exhibits no distension. There is no rebound and no guarding.   Prevena removed   Incision clean, dry, intact, and without erythema.               Musculoskeletal: Normal range of motion and moves all extremeties.        Skin: Skin is warm and dry. She is not diaphoretic. No cyanosis or erythema. Nails show no clubbing.    Psychiatric: She has a normal mood and affect. Her behavior is normal.         Assessment:     1. Follow-up examination, following other surgery    2. Hypertension, unspecified type         Plan:     Follow-up  examination, following other surgery    Hypertension, unspecified type         Continue Procardia 60mg  Routine PP care  Wound care discussed     Follow up in about 4 weeks (around 3/13/2024) for w/Dr Zimmerman .

## 2024-02-26 ENCOUNTER — PATIENT MESSAGE (OUTPATIENT)
Dept: OBSTETRICS AND GYNECOLOGY | Facility: CLINIC | Age: 24
End: 2024-02-26
Payer: MEDICAID

## 2024-03-12 ENCOUNTER — POSTPARTUM VISIT (OUTPATIENT)
Dept: OBSTETRICS AND GYNECOLOGY | Facility: CLINIC | Age: 24
End: 2024-03-12
Payer: MEDICAID

## 2024-03-12 VITALS
WEIGHT: 293 LBS | SYSTOLIC BLOOD PRESSURE: 123 MMHG | HEART RATE: 79 BPM | DIASTOLIC BLOOD PRESSURE: 80 MMHG | BODY MASS INDEX: 53.67 KG/M2

## 2024-03-12 DIAGNOSIS — F41.9 ANXIETY: ICD-10-CM

## 2024-03-12 RX ORDER — NIFEDIPINE 30 MG/1
30 TABLET, EXTENDED RELEASE ORAL
COMMUNITY
Start: 2024-02-10

## 2024-03-12 RX ORDER — ESCITALOPRAM OXALATE 10 MG/1
10 TABLET ORAL DAILY
Qty: 30 TABLET | Refills: 2 | Status: SHIPPED | OUTPATIENT
Start: 2024-03-12 | End: 2025-03-12

## 2024-03-12 NOTE — PROGRESS NOTES
Subjective:       Patient ID: Naina Brown is a 23 y.o. female.    Chief Complaint:  Postpartum Care      History of Present Illness  Here for 6 wk pp exam sp cs  Complaints anxiety    Review of Systems  Review of Systems   Constitutional:  Negative for activity change, appetite change, chills, diaphoresis and fever.   Respiratory:  Negative for shortness of breath.    Cardiovascular:  Negative for chest pain.   Gastrointestinal:  Negative for abdominal pain, bloating, constipation, nausea and vomiting.   Genitourinary:  Negative for dysuria, flank pain, hematuria and menorrhagia.   Integumentary:  Negative for breast mass, breast skin changes and breast tenderness.   Neurological:  Negative for headaches.   Psychiatric/Behavioral:  Negative for depression. The patient is not nervous/anxious.    Breast: Negative for lump, mass, mastodynia, skin changes and tenderness          Objective:    Physical Exam:   Constitutional: She appears well-developed and well-nourished. No distress.    HENT:   Head: Normocephalic.    Eyes: Conjunctivae and EOM are normal.    Neck: No tracheal deviation present. No thyromegaly present.    Cardiovascular:       Exam reveals no clubbing, no cyanosis and no edema.        Pulmonary/Chest: Effort normal. No respiratory distress.                  Musculoskeletal: Normal range of motion and moves all extremeties.        Skin: No rash noted. She is not diaphoretic. No cyanosis. Nails show no clubbing.    Psychiatric: She has a normal mood and affect. Her behavior is normal. Judgment and thought content normal.          Assessment:     Postpartum  Depression screen nml      Plan:   rtc for annual or prn  Contraception nexpl  Preventative screening utd

## 2024-03-26 ENCOUNTER — PATIENT MESSAGE (OUTPATIENT)
Dept: OBSTETRICS AND GYNECOLOGY | Facility: CLINIC | Age: 24
End: 2024-03-26
Payer: MEDICAID

## 2024-03-27 ENCOUNTER — PATIENT MESSAGE (OUTPATIENT)
Dept: OBSTETRICS AND GYNECOLOGY | Facility: CLINIC | Age: 24
End: 2024-03-27
Payer: MEDICAID

## 2024-04-09 ENCOUNTER — OFFICE VISIT (OUTPATIENT)
Dept: OBSTETRICS AND GYNECOLOGY | Facility: CLINIC | Age: 24
End: 2024-04-09
Payer: MEDICAID

## 2024-04-09 VITALS
BODY MASS INDEX: 55.09 KG/M2 | DIASTOLIC BLOOD PRESSURE: 83 MMHG | WEIGHT: 293 LBS | SYSTOLIC BLOOD PRESSURE: 131 MMHG | HEART RATE: 90 BPM

## 2024-04-09 DIAGNOSIS — Z30.017 INSERTION OF NEXPLANON: Primary | ICD-10-CM

## 2024-04-09 PROCEDURE — 99499 UNLISTED E&M SERVICE: CPT | Mod: S$GLB,,, | Performed by: OBSTETRICS & GYNECOLOGY

## 2024-04-09 PROCEDURE — 11981 INSERTION DRUG DLVR IMPLANT: CPT | Mod: S$GLB,,, | Performed by: OBSTETRICS & GYNECOLOGY

## 2024-04-09 NOTE — PROCEDURES
Insertion of Nexplanon    Date/Time: 4/9/2024 10:15 AM    Performed by: Jason Zimmerman MD  Authorized by: Jason Zimmerman MD    Consent:   Consent given by:  Patient  Patient questions answered: yes    Patient agrees, verbalizes understanding, and wants to proceed: yes    Instructions and paperwork completed: yes    Pre-Procedure:   Local anesthetic:  Lidocaine with epinephrine  The site was cleaned and prepped in a sterile fashion: yes    Insertion Procedure:   Left/right:  left arm   68 mg etonogestreL 68 mg  Preloaded Implanon trocar was placed subdermally: yes    Visualization of implant was obtained: yes    Nexplanon was inserted and trocar removed: yes    Visualization of notch in stilette and palpitation of device: yes    Palpitation confirms placement by provider and patient: yes

## 2024-09-16 ENCOUNTER — OFFICE VISIT (OUTPATIENT)
Dept: FAMILY MEDICINE | Facility: CLINIC | Age: 24
End: 2024-09-16
Payer: MEDICAID

## 2024-09-16 VITALS
TEMPERATURE: 97 F | WEIGHT: 293 LBS | SYSTOLIC BLOOD PRESSURE: 118 MMHG | DIASTOLIC BLOOD PRESSURE: 82 MMHG | HEIGHT: 63 IN | BODY MASS INDEX: 51.91 KG/M2 | HEART RATE: 88 BPM | OXYGEN SATURATION: 98 %

## 2024-09-16 DIAGNOSIS — Z00.00 ANNUAL PHYSICAL EXAM: Primary | ICD-10-CM

## 2024-09-16 DIAGNOSIS — D64.9 ANEMIA, UNSPECIFIED TYPE: ICD-10-CM

## 2024-09-16 DIAGNOSIS — R73.03 PREDIABETES: ICD-10-CM

## 2024-09-16 LAB
ALBUMIN SERPL-MCNC: 3.7 G/DL (ref 3.4–5)
ALBUMIN/GLOB SERPL: 1.1 RATIO
ALP SERPL-CCNC: 130 UNIT/L (ref 50–144)
ALT SERPL-CCNC: 23 UNIT/L (ref 1–45)
ANION GAP SERPL CALC-SCNC: 8 MEQ/L (ref 2–13)
AST SERPL-CCNC: 27 UNIT/L (ref 14–36)
BASOPHILS # BLD AUTO: 0.04 X10(3)/MCL (ref 0.01–0.08)
BASOPHILS NFR BLD AUTO: 0.4 % (ref 0.1–1.2)
BILIRUB SERPL-MCNC: 0.2 MG/DL (ref 0–1)
BUN SERPL-MCNC: 10 MG/DL (ref 7–20)
CALCIUM SERPL-MCNC: 9.1 MG/DL (ref 8.4–10.2)
CHLORIDE SERPL-SCNC: 109 MMOL/L (ref 98–110)
CO2 SERPL-SCNC: 25 MMOL/L (ref 21–32)
CREAT SERPL-MCNC: 0.81 MG/DL (ref 0.66–1.25)
CREAT/UREA NIT SERPL: 12 (ref 12–20)
EOSINOPHIL # BLD AUTO: 0.23 X10(3)/MCL (ref 0.04–0.36)
EOSINOPHIL NFR BLD AUTO: 2.1 % (ref 0.7–7)
ERYTHROCYTE [DISTWIDTH] IN BLOOD BY AUTOMATED COUNT: 15.3 % (ref 11–14.5)
EST. AVERAGE GLUCOSE BLD GHB EST-MCNC: 137 MG/DL (ref 70–115)
GFR SERPLBLD CREATININE-BSD FMLA CKD-EPI: >90 ML/MIN/1.73/M2
GLOBULIN SER-MCNC: 3.3 GM/DL (ref 2–3.9)
GLUCOSE SERPL-MCNC: 107 MG/DL (ref 70–115)
HBA1C MFR BLD: 6.4 % (ref 4–6)
HCT VFR BLD AUTO: 36.6 % (ref 36–48)
HGB BLD-MCNC: 11.6 G/DL (ref 11.8–16)
IMM GRANULOCYTES # BLD AUTO: 0.06 X10(3)/MCL (ref 0–0.03)
IMM GRANULOCYTES NFR BLD AUTO: 0.5 % (ref 0–0.5)
LYMPHOCYTES # BLD AUTO: 2.49 X10(3)/MCL (ref 1.16–3.74)
LYMPHOCYTES NFR BLD AUTO: 22.7 % (ref 20–55)
MCH RBC QN AUTO: 23.8 PG (ref 27–34)
MCHC RBC AUTO-ENTMCNC: 31.7 G/DL (ref 31–37)
MCV RBC AUTO: 75.2 FL (ref 79–99)
MONOCYTES # BLD AUTO: 0.66 X10(3)/MCL (ref 0.24–0.36)
MONOCYTES NFR BLD AUTO: 6 % (ref 4.7–12.5)
NEUTROPHILS # BLD AUTO: 7.49 X10(3)/MCL (ref 1.56–6.13)
NEUTROPHILS NFR BLD AUTO: 68.3 % (ref 37–73)
NRBC BLD AUTO-RTO: 0 %
PLATELET # BLD AUTO: 318 X10(3)/MCL (ref 140–371)
PMV BLD AUTO: 10.5 FL (ref 9.4–12.4)
POTASSIUM SERPL-SCNC: 4.3 MMOL/L (ref 3.5–5.1)
PROT SERPL-MCNC: 7 GM/DL (ref 6.3–8.2)
RBC # BLD AUTO: 4.87 X10(6)/MCL (ref 4–5.1)
SODIUM SERPL-SCNC: 142 MMOL/L (ref 136–145)
TSH SERPL-ACNC: 0.85 UIU/ML (ref 0.36–3.74)
WBC # BLD AUTO: 10.97 X10(3)/MCL (ref 4–11.5)

## 2024-09-16 PROCEDURE — 1159F MED LIST DOCD IN RCRD: CPT | Mod: CPTII,,, | Performed by: NURSE PRACTITIONER

## 2024-09-16 PROCEDURE — 83036 HEMOGLOBIN GLYCOSYLATED A1C: CPT | Performed by: NURSE PRACTITIONER

## 2024-09-16 PROCEDURE — 82306 VITAMIN D 25 HYDROXY: CPT | Performed by: NURSE PRACTITIONER

## 2024-09-16 PROCEDURE — 3008F BODY MASS INDEX DOCD: CPT | Mod: CPTII,,, | Performed by: NURSE PRACTITIONER

## 2024-09-16 PROCEDURE — 85025 COMPLETE CBC W/AUTO DIFF WBC: CPT | Performed by: NURSE PRACTITIONER

## 2024-09-16 PROCEDURE — 1160F RVW MEDS BY RX/DR IN RCRD: CPT | Mod: CPTII,,, | Performed by: NURSE PRACTITIONER

## 2024-09-16 PROCEDURE — 3079F DIAST BP 80-89 MM HG: CPT | Mod: CPTII,,, | Performed by: NURSE PRACTITIONER

## 2024-09-16 PROCEDURE — 36415 COLL VENOUS BLD VENIPUNCTURE: CPT | Performed by: NURSE PRACTITIONER

## 2024-09-16 PROCEDURE — 84443 ASSAY THYROID STIM HORMONE: CPT | Performed by: NURSE PRACTITIONER

## 2024-09-16 PROCEDURE — 3044F HG A1C LEVEL LT 7.0%: CPT | Mod: CPTII,,, | Performed by: NURSE PRACTITIONER

## 2024-09-16 PROCEDURE — 99204 OFFICE O/P NEW MOD 45 MIN: CPT | Mod: ,,, | Performed by: NURSE PRACTITIONER

## 2024-09-16 PROCEDURE — 3074F SYST BP LT 130 MM HG: CPT | Mod: CPTII,,, | Performed by: NURSE PRACTITIONER

## 2024-09-16 PROCEDURE — 80053 COMPREHEN METABOLIC PANEL: CPT | Performed by: NURSE PRACTITIONER

## 2024-09-16 RX ORDER — METFORMIN HYDROCHLORIDE 500 MG/1
500 TABLET, EXTENDED RELEASE ORAL
Qty: 90 TABLET | Refills: 3 | Status: SHIPPED | OUTPATIENT
Start: 2024-09-16 | End: 2024-09-17 | Stop reason: SDUPTHER

## 2024-09-16 NOTE — PROGRESS NOTES
Patient ID: Naina Brown  : 2000    Chief Complaint: Establish Care, Obesity, and Edema (Legs and feet. Bilateral)    Allergies: Patient has No Known Allergies.     History of Present Illness:  The patient presents to clinic for Establish Care, Obesity, and Edema (Legs and feet. Bilateral) HPI: Patient is here today to establish care. Patient is having weight gain and is concerned about that. She had a baby back in February and she hasn't been able to control her weight. She also has been having swelling in her legs and feel like she has when she was pregnant. She stated she did have problems when pregnant like blood pressure issues and swelling. Patient is not fasting today     Social History:  reports that she has never smoked. She has never used smokeless tobacco. She reports that she does not currently use alcohol. She reports that she does not currently use drugs after having used the following drugs: Marijuana.    Past Medical History:  has no past medical history on file.    Surgical History:   Past Surgical History:   Procedure Laterality Date     SECTION  2024    Emergency        Current Medications:  Current Outpatient Medications   Medication Instructions    cholecalciferol (vitamin D3) 1,250 mcg, Oral, Every 7 days    metFORMIN (GLUCOPHAGE-XR) 500 mg, Oral, With breakfast       Review of Systems   Constitutional:  Positive for activity change and unexpected weight change.   HENT:  Negative for hearing loss, rhinorrhea and trouble swallowing.    Eyes:  Negative for discharge and visual disturbance.   Respiratory:  Negative for chest tightness and wheezing.    Cardiovascular:  Negative for chest pain and palpitations.   Gastrointestinal:  Negative for blood in stool, constipation, diarrhea and vomiting.   Endocrine: Negative for polydipsia and polyuria.   Genitourinary:  Negative for difficulty urinating, dysuria, hematuria and menstrual problem.   Musculoskeletal:  Negative  "for arthralgias, joint swelling and neck pain.   Neurological:  Negative for weakness and headaches.   Psychiatric/Behavioral:  Positive for dysphoric mood. Negative for confusion.    All other systems reviewed and are negative.     A comprehensive review of symptoms was completed and negative except as noted above.    Visit Vitals  /82   Pulse 88   Temp 96.8 °F (36 °C)   Ht 5' 3" (1.6 m)   Wt (!) 164.7 kg (363 lb)   SpO2 98%   BMI 64.30 kg/m²       Physical Exam  Vitals and nursing note reviewed.   Constitutional:       Appearance: Normal appearance. She is normal weight.   HENT:      Head: Normocephalic and atraumatic.      Right Ear: Tympanic membrane, ear canal and external ear normal.      Left Ear: Tympanic membrane, ear canal and external ear normal.      Nose: Nose normal.      Mouth/Throat:      Mouth: Mucous membranes are moist.      Pharynx: Oropharynx is clear.   Eyes:      Extraocular Movements: Extraocular movements intact.      Conjunctiva/sclera: Conjunctivae normal.      Pupils: Pupils are equal, round, and reactive to light.   Cardiovascular:      Rate and Rhythm: Normal rate and regular rhythm.      Heart sounds: Normal heart sounds.   Pulmonary:      Effort: Pulmonary effort is normal.      Breath sounds: Normal breath sounds.   Abdominal:      General: Abdomen is flat. Bowel sounds are normal. There is no distension.      Palpations: Abdomen is soft. There is no mass.      Tenderness: There is no abdominal tenderness. There is no guarding or rebound.      Hernia: No hernia is present.   Musculoskeletal:         General: Normal range of motion.      Cervical back: Normal range of motion and neck supple.      Right lower leg: No edema.      Left lower leg: No edema.   Skin:     General: Skin is warm and dry.      Coloration: Skin is not jaundiced or pale.      Findings: No rash.   Neurological:      General: No focal deficit present.      Mental Status: She is alert and oriented to person, " place, and time. Mental status is at baseline.   Psychiatric:         Mood and Affect: Mood normal.         Behavior: Behavior normal.         Thought Content: Thought content normal.         Judgment: Judgment normal.          Labs Reviewed:  Chemistry:  Lab Results   Component Value Date     09/16/2024    K 4.3 09/16/2024    BUN 10 09/16/2024    CREATININE 0.81 09/16/2024    EGFRNORACEVR >90 09/16/2024    GLUCOSE 107 09/16/2024    CALCIUM 9.1 09/16/2024    ALKPHOS 130 09/16/2024    LABPROT 7.0 09/16/2024    ALBUMIN 3.7 09/16/2024    AST 27 09/16/2024    ALT 23 09/16/2024    YWNRPNID22NC 14 (L) 09/16/2024    TSH 0.850 09/16/2024        Lab Results   Component Value Date    HGBA1C 6.4 (H) 09/16/2024        Hematology:  Lab Results   Component Value Date    WBC 10.97 09/16/2024    RBC 4.87 09/16/2024    HGB 11.6 (L) 09/16/2024    HCT 36.6 09/16/2024    MCV 75.2 (L) 09/16/2024    MCH 23.8 (L) 09/16/2024    MCHC 31.7 09/16/2024    RDW 15.3 (H) 09/16/2024     09/16/2024    MPV 10.5 09/16/2024       Assessment & Plan:  1. Annual physical exam  -     Hemoglobin A1C  -     Vitamin D  -     TSH  -     Comprehensive Metabolic Panel  -     CBC Auto Differential  -     Lipid Panel; Future; Expected date: 09/16/2024    2. Prediabetes  -     metFORMIN (GLUCOPHAGE-XR) 500 MG ER 24hr tablet; Take 1 tablet (500 mg total) by mouth daily with breakfast.  Dispense: 90 tablet; Refill: 3    3. Anemia, unspecified type  -     Ferritin; Future; Expected date: 09/16/2024  -     Iron and TIBC; Future; Expected date: 09/16/2024  -     Iron; Future; Expected date: 09/16/2024  -     Vitamin B12; Future; Expected date: 09/16/2024  -     Reticulocytes; Future; Expected date: 09/16/2024         Follow up appt in 4 week for medication evaluation.      Lab Frequency Next Occurrence        Ivon Clay, FNP-C

## 2024-09-17 ENCOUNTER — TELEPHONE (OUTPATIENT)
Dept: FAMILY MEDICINE | Facility: CLINIC | Age: 24
End: 2024-09-17
Payer: MEDICAID

## 2024-09-17 DIAGNOSIS — E55.9 VITAMIN D DEFICIENCY: Primary | ICD-10-CM

## 2024-09-17 DIAGNOSIS — R73.03 PREDIABETES: ICD-10-CM

## 2024-09-17 LAB — 25(OH)D3+25(OH)D2 SERPL-MCNC: 14 NG/ML (ref 30–80)

## 2024-09-17 RX ORDER — CHOLECALCIFEROL (VITAMIN D3) 1250 MCG
1250 TABLET ORAL
Qty: 5 TABLET | Refills: 3 | Status: SHIPPED | OUTPATIENT
Start: 2024-09-17

## 2024-09-17 RX ORDER — METFORMIN HYDROCHLORIDE 500 MG/1
500 TABLET, EXTENDED RELEASE ORAL
Qty: 90 TABLET | Refills: 3 | Status: SHIPPED | OUTPATIENT
Start: 2024-09-17 | End: 2025-09-17

## 2024-09-17 NOTE — TELEPHONE ENCOUNTER
I called and notified pt of results of TSH and VIT D. She verbally understands. I called out a vit d supplement and also resent metformin since they did not get it at pharmacy.     I am also sending all labs she needs done to [facility]. Pt is notified of this as well

## 2024-09-30 ENCOUNTER — TELEPHONE (OUTPATIENT)
Dept: FAMILY MEDICINE | Facility: CLINIC | Age: 24
End: 2024-09-30
Payer: MEDICAID

## 2024-09-30 DIAGNOSIS — R73.03 PREDIABETES: Primary | ICD-10-CM

## 2024-09-30 RX ORDER — TIRZEPATIDE 2.5 MG/.5ML
2.5 INJECTION, SOLUTION SUBCUTANEOUS
Qty: 4 PEN | Refills: 0 | Status: SHIPPED | OUTPATIENT
Start: 2024-09-30